# Patient Record
Sex: MALE | Race: WHITE | NOT HISPANIC OR LATINO | URBAN - METROPOLITAN AREA
[De-identification: names, ages, dates, MRNs, and addresses within clinical notes are randomized per-mention and may not be internally consistent; named-entity substitution may affect disease eponyms.]

---

## 2019-07-17 ENCOUNTER — AMBULATORY - HEALTHEAST (OUTPATIENT)
Dept: PHYSICAL MEDICINE AND REHAB | Facility: CLINIC | Age: 49
End: 2019-07-17

## 2019-07-17 DIAGNOSIS — M48.00 SPINAL STENOSIS, UNSPECIFIED SPINAL REGION: ICD-10-CM

## 2019-07-18 ENCOUNTER — HOSPITAL ENCOUNTER (OUTPATIENT)
Dept: PHYSICAL MEDICINE AND REHAB | Facility: CLINIC | Age: 49
Discharge: HOME | End: 2019-07-18
Attending: EMERGENCY MEDICINE

## 2019-07-18 DIAGNOSIS — R20.0 NUMBNESS AND TINGLING IN LEFT HAND: ICD-10-CM

## 2019-07-18 DIAGNOSIS — R29.898 LEFT ARM WEAKNESS: ICD-10-CM

## 2019-07-18 DIAGNOSIS — M48.02 FORAMINAL STENOSIS OF CERVICAL REGION: ICD-10-CM

## 2019-07-18 DIAGNOSIS — R20.2 NUMBNESS AND TINGLING IN LEFT HAND: ICD-10-CM

## 2019-07-18 DIAGNOSIS — M54.2 CERVICALGIA: ICD-10-CM

## 2019-07-18 DIAGNOSIS — M50.30 BULGING OF CERVICAL INTERVERTEBRAL DISC: ICD-10-CM

## 2019-07-18 RX ORDER — GABAPENTIN 100 MG/1
300 CAPSULE ORAL 3 TIMES DAILY
Qty: 270 CAPSULE | Refills: 3 | Status: SHIPPED | OUTPATIENT
Start: 2019-07-18

## 2019-07-19 ENCOUNTER — AMBULATORY - HEALTHEAST (OUTPATIENT)
Dept: PHYSICAL MEDICINE AND REHAB | Facility: CLINIC | Age: 49
End: 2019-07-19

## 2019-07-19 ENCOUNTER — COMMUNICATION - HEALTHEAST (OUTPATIENT)
Dept: PHYSICAL MEDICINE AND REHAB | Facility: CLINIC | Age: 49
End: 2019-07-19

## 2019-07-19 DIAGNOSIS — M50.30 BULGING OF CERVICAL INTERVERTEBRAL DISC: ICD-10-CM

## 2019-07-19 DIAGNOSIS — R20.0 NUMBNESS AND TINGLING IN LEFT HAND: ICD-10-CM

## 2019-07-19 DIAGNOSIS — M54.2 CERVICALGIA: ICD-10-CM

## 2019-07-19 DIAGNOSIS — R20.2 NUMBNESS AND TINGLING IN LEFT HAND: ICD-10-CM

## 2019-07-19 DIAGNOSIS — R29.898 LEFT ARM WEAKNESS: ICD-10-CM

## 2019-07-19 DIAGNOSIS — M48.02 FORAMINAL STENOSIS OF CERVICAL REGION: ICD-10-CM

## 2019-08-15 ENCOUNTER — COMMUNICATION - HEALTHEAST (OUTPATIENT)
Dept: PHYSICAL MEDICINE AND REHAB | Facility: CLINIC | Age: 49
End: 2019-08-15

## 2020-01-16 ENCOUNTER — RECORDS - HEALTHEAST (OUTPATIENT)
Dept: LAB | Facility: CLINIC | Age: 50
End: 2020-01-16

## 2020-01-16 LAB
ALBUMIN SERPL-MCNC: 4.5 G/DL (ref 3.5–5)
ALP SERPL-CCNC: 72 U/L (ref 45–120)
ALT SERPL W P-5'-P-CCNC: 38 U/L (ref 0–45)
ANION GAP SERPL CALCULATED.3IONS-SCNC: 12 MMOL/L (ref 5–18)
AST SERPL W P-5'-P-CCNC: 33 U/L (ref 0–40)
BILIRUB SERPL-MCNC: 1.1 MG/DL (ref 0–1)
BUN SERPL-MCNC: 13 MG/DL (ref 8–22)
CALCIUM SERPL-MCNC: 10 MG/DL (ref 8.5–10.5)
CHLORIDE BLD-SCNC: 107 MMOL/L (ref 98–107)
CHOLEST SERPL-MCNC: 172 MG/DL
CO2 SERPL-SCNC: 23 MMOL/L (ref 22–31)
CREAT SERPL-MCNC: 0.92 MG/DL (ref 0.7–1.3)
FASTING STATUS PATIENT QL REPORTED: NORMAL
GFR SERPL CREATININE-BSD FRML MDRD: >60 ML/MIN/1.73M2
GLUCOSE BLD-MCNC: 96 MG/DL (ref 70–125)
HDLC SERPL-MCNC: 51 MG/DL
LDLC SERPL CALC-MCNC: 102 MG/DL
POTASSIUM BLD-SCNC: 4.7 MMOL/L (ref 3.5–5)
PROT SERPL-MCNC: 7.2 G/DL (ref 6–8)
PSA SERPL-MCNC: 0.9 NG/ML (ref 0–2.5)
SODIUM SERPL-SCNC: 142 MMOL/L (ref 136–145)
TRIGL SERPL-MCNC: 96 MG/DL

## 2021-05-30 NOTE — TELEPHONE ENCOUNTER
Patient calling to request that the PT referral be sent to [facility] [place] Fax: [phone]. [facility] is not in network for him therefore needs to go to [facility].

## 2021-05-30 NOTE — PROGRESS NOTES
ASSESSMENT: Krunal Warner is a 48 y.o. male presents for consultation at the request of [facility] PCP Arpan Banda MD, with past medical history significant for pulmonary embolism, chest pain, who presents today for new patient evaluation of :     -Acute cervicalgia onset 7/8/2019 with numbness and tingling left arm into the left third finger consistent with C7 radiculopathy, some weakness on exam wrist extensors/flexors.    No myelopathic or red flag symptoms.      NDI Score: 40    WHO 5: 20       Diagnoses and all orders for this visit:    Cervicalgia  -     Ambulatory referral to PT/OT  -     gabapentin (NEURONTIN) 100 MG capsule; Take 300 mg by mouth 3 (three) times a day. Follow Gabapentin Dosing chart given  Dispense: 270 capsule; Refill: 3    Numbness and tingling in left hand  -     Ambulatory referral to PT/OT  -     gabapentin (NEURONTIN) 100 MG capsule; Take 300 mg by mouth 3 (three) times a day. Follow Gabapentin Dosing chart given  Dispense: 270 capsule; Refill: 3    Foraminal stenosis of cervical region  -     Ambulatory referral to PT/OT    Bulging of cervical intervertebral disc  -     Ambulatory referral to PT/OT    Left arm weakness  -     Ambulatory referral to PT/OT       PLAN:  Reviewed spine anatomy and disease process. Discussed diagnosis and treatment options with the patient today. A shared decision making model was used. The patient's values and choices were respected. The following represents what was discussed and decided upon by the provider and the patient.     -DIAGNOSTIC TESTS:  Images were personally reviewed and interpreted and explained to patient today using spine model.   --Consider upper extremity EMG if weakness has not improved in 2 weeks timeframe.  --Cervical spine MRI shows multilevel facet arthropathy with multilevel foraminal stenosis, greatest at C6-7 with severe right moderate left foraminal stenosis.  Moderate right foraminal stenosis C4-5 and C5-6.  No high-grade  central stenosis.    -PHYSICAL THERAPY: Referral to physical therapy [facility] to establish cervical core strengthening and nerve glides today.  Discussed the importance of core strengthening, ROM, stretching exercises with the patient and how each of these entities is important in decreasing pain.  Explained to the patient that the purpose of physical therapy is to teach the patient a home exercise program.  These exercises need to be performed every day in order to decrease pain and prevent future occurrences of pain.  Likened it to brushing one's teeth.      -MEDICATIONS: Prescribed gabapentin 100 mg to titrate up to 3 tablets 3 times daily as tolerated for radicular pain.  -Advised patient that he can continue ibuprofen.  Discussed multiple medication options today with patient. Discussed risks, side effects, and proper use of medications. Patient verbalized understanding.    -INTERVENTIONS: No further injection recommendations at this time however we could consider C7-T1 IL JULISSA if symptoms are not improving versus left C6-7 TF JULISSA.  We would have to hold Coumadin however prior to considering injection for 4 days.  **Coumadin anticoagulation therapy.  Discussed risks and benefits of injections with patient today.    -Work restrictions x1 week given, discussed if he needs another week we can do that without seeing him in clinic however if he needs further workability beyond 2 weeks timeframe I would need to see him in clinic.  Patient is hoping to be back to work without restrictions in 1 week timeframe however.    -PATIENT EDUCATION: 45 minutes of total visit time was spent face to face with the patient today, greater than 50% of total time spent with patient was spent on counseling, education, and coordinating care.   -10 minutes spent outside of visit time, non-face-to-face time, reviewing chart.    -FOLLOW-UP:   Follow-up in 2 weeks to reassess left upper extremity strength.    Advised  patient to [facility] if symptoms worsen or you have problems controlling bladder and bowel function.   ______________________________________________________________________    SUBJECTIVE:   Krunal Warner  is a 48 y.o. male who presents today for new patient evaluation of neck pain generalized cervicalgia upper cervical spine as well as lower cervical spine is been ongoing since 7/8/2019 with no known injury.  Pain is a current 5/10, and 8 at its worst, a 3 to its best.  He does have numbness and tingling and some weakness in his left upper extremity as well that radiates into the hand specifically the middle finger most intensely.  Patient denies right arm symptoms, denies recent trips or falls or balance changes, denies bowel or bladder loss control.  No prior history of neck pain otherwise.    -Treatment to Date: No prior spinal surgery or spinal injection.  No prior physical therapy or chiropractic treatments.    -Medications:  Medrol Dosepak prescribed 7/12/2019 with no benefit  Excedrin OTC with no benefit  Ibuprofen with minimal benefit.    *Coumadin anticoagulation therapy    Current Outpatient Medications on File Prior to Encounter   Medication Sig Dispense Refill     pravastatin (PRAVACHOL) 40 MG tablet Take 40 mg by mouth bedtime.       rivaroxaban (XARELTO) 20 mg tablet Take 20 mg by mouth daily.       [DISCONTINUED] ALBUTEROL INHL Inhale 2 puffs 4 (four) times a day as needed.        [DISCONTINUED] aspirin 81 MG EC tablet Take 81 mg by mouth daily as needed for pain.       [DISCONTINUED] benzocaine-menthol (CEPACOL) 15-3.6 mg Take 1 lozenge by mouth every hour as needed.  0     [DISCONTINUED] HYDROcodone-acetaminophen 5-325 mg per tablet Take 1 tablet by mouth every 6 (six) hours as needed for pain. 15 tablet 0     [DISCONTINUED] methylPREDNISolone (MEDROL DOSEPACK) 4 mg tablet Follow package directions 1 tablet 0     [DISCONTINUED] warfarin (COUMADIN) 5 MG tablet Take 5mg daily 30 tablet  1     No current facility-administered medications on file prior to encounter.        No Known Allergies    Past Medical History:   Diagnosis Date     DVT (deep venous thrombosis) (H)      Hyperlipidemia      Migraines      Pulmonary embolism (H)     Was on Warfarin temporarily         Patient Active Problem List   Diagnosis     Pulmonary embolism (H)     Chest pain     Abdominal pain       No past surgical history on file.    No family history on file.    Reviewed past medical, surgical, and family history with patient found on new patient intake packet located in [employer]MR [employer] tab.     SOCIAL HX: Patient is , works in finance.  Patient denies smoking/tobacco use, does drink 2 beers monthly but denies history being a heavy drinker, denies recreational drug use.    ROS: Positive for imbalance, dizziness, headache.  Specifically negative for bowel/bladder dysfunction, foot drop, fevers, chills, appetite changes, nausea/vomiting, unexplained weight loss. Otherwise 13 systems reviewed are negative. Please see the patient's intake questionnaire from today for details.    OBJECTIVE:  /90 (Patient Site: Right Arm, Patient Position: Sitting)   Pulse 84   Wt (!) 259 lb (117.5 kg)   SpO2 95%   BMI 31.53 kg/m      PHYSICAL EXAMINATION:  --CONSTITUTIONAL: Vital signs as above. No acute distress. The patient is well nourished and well groomed.  --PSYCHIATRIC: The patient is awake, alert, oriented to person, place, time and answering questions appropriately with clear speech. Appropriate mood and affect   --HEENT: Sclera are non-injected. Extraocular muscles are intact. Thyroid moves easily upon swallowing.  Moist oral mucosa.  --SKIN: Skin over the face, bilateral upper extremities, and posterior torso is clean, dry, intact without rashes.  --RESPIRATORY: Normal rhythm and effort. No abnormal accessory muscle breathing patterns noted.   --GROSS MOTOR: Easily arises from a seated position. Toe walking and heel  walking are normal.    --CERVICAL SPINE: Inspection reveals no evidence of deformity. Range of motion is not limited in cervical flexion, extension, lateral rotation.  Tenderness to palpation bilateral upper cervical spine region and C6-7 region.  Spurling maneuver negative on the right, positive on the left.  --SHOULDERS: Full range of motion bilaterally. Negative empty can.  --UPPER EXTREMITY MOTOR TESTING:  Wrist flexion left 4/5, right 5/5  Wrist extension left 4/5, right 5/5  Pronators left 5/5, right 5/5  Biceps left 5/5, right 5/5   Triceps left 5/5, right 5/5   Shoulder abduction left 5/5, right 5/5   left 5/5, right 5/5  --NEUROLOGIC: CN III-XII are grossly intact. 2/4 symmetric biceps, brachioradialis, triceps reflexes bilaterally. Sensation to upper extremities is diminished left third finger.  Negative Rendon's bilaterally.    --VASCULAR: 2/4 radial pulses bilaterally. Warm upper limbs bilaterally. Capillary refill in the upper extremities is less than 1 second.    RESULTS: Prior medical records from [facility] 9/12/2018 to [facility] and [facility] everywhere were reviewed today.     Imaging:  Cervical spine Imaging was personally reviewed and interpreted today. The images were shown to the patient and the findings were explained using a spine model.      Cta Head And Neck  Result Date: 7/12/2019  CONCLUSION: HEAD CT: 1.  Normal for age. 2.  No CT finding of a mass, infarct or hemorrhage. HEAD CTA: 1.  No branch vessel occlusion, high-grade stenosis, aneurysm, or high flow vascular malformation involving proximal [place] vessels. 2.  Incidentally noted fetal origin of the left posterior cerebral artery. NECK CTA: 1.  No significant stenosis in the neck vessels based on NASCET criteria. 2.  No evidence for dissection.      Mr Brain Without Contrast  Result Date: 7/12/2019  CONCLUSION: 1.  No acute infarct, mass or hemorrhage.      Mr Cervical Spine Without Contrast  Result Date:  7/12/2019  INDICATION: Posterior headache/neck pain with left hand tingling COMPARISON: None. TECHNIQUE: Without IV contrast. FINDINGS: Normal vertebral body heights, alignment and marrow signal. Normal appearance of the cervical spinal cord, without pathologic signal abnormality or expansion. Visualized intracranial contents, craniocervical junction, and anterior C1-C2 articulation are unremarkable. Visualized paraspinous soft tissues are unremarkable. The expected vertebral artery flow voids are maintained. Mild diffuse disc desiccation throughout the cervical spine.   C2-C3: Normal disc height. No herniation. Mild bilateral facet arthropathy. No spinal canal stenosis. No right neural foraminal stenosis. No left neural foraminal stenosis.   C3-C4: Normal disc height. Small posterior disc bulge. Mild to moderate bilateral facet arthropathy. Mild bilateral uncovertebral joint hypertrophy. No spinal canal stenosis. Mild right neural foraminal stenosis. Mild left neural foraminal stenosis.   C4-C5: Normal disc height. Small central disc protrusion. Moderate bilateral facet arthropathy. Moderate right and mild left uncovertebral joint hypertrophy. No spinal canal stenosis. Moderate right neural foraminal stenosis. Mild left neural foraminal stenosis.    C5-C6: Normal disc height. Small posterior disc bulge. Moderate right and mild left facet arthropathy. Mild bilateral uncovertebral joint hypertrophy. No spinal canal stenosis. Moderate right neural foraminal stenosis. No left neural foraminal stenosis.   C6-C7: Normal disc height. Broad-based posterior disc bulge, mildly eccentric to the right. Moderate bilateral facet arthropathy. Moderate right and mild left uncovertebral joint hypertrophy. No spinal canal stenosis. Severe right neural foraminal stenosis. Moderate left neural foraminal stenosis.   C7-T1: Normal disc height. No herniation. Mild bilateral facet arthropathy. Mild right uncovertebral joint hypertrophy.  No spinal canal stenosis. Mild right neural foraminal stenosis. No left neural foraminal stenosis.   CONCLUSION:   1.  Multilevel degenerative facet arthropathy and uncovertebral joint hypertrophy throughout the cervical spine, greatest at C6-7, where there is severe right and moderate left neuroforaminal stenosis.   2.  Moderate right neuroforaminal stenosis at C4-5 and C5-6.   3.  No spinal canal stenosis at any level.

## 2021-05-31 NOTE — TELEPHONE ENCOUNTER
"Patient calling to ask how to come off of the Gabapentin he was prescribed 7/18/19. Denies side effects. Explained that this is a medication patients typically take for 2-3 months and then wean off. He is reporting his pain is \"much better than before. I have been taking the meds (3-3-3) and doing PT (now only [facility]). Headaches are improved too. Get one occasionally and it will only be for a day.\" He did report he was feeling better before he got to the 3 three times a day dose. Explained he could cut back to 2 three times a day and see what his symptoms do. Encouraged to return if symptoms return or worsen. Stated understanding.     Chart reviewed. Patient was to return to clinic 2 weeks after consult to recheck left arm strength. Patient reports he feels that this is normal. He does not have the arm tingling anymore either.   "

## 2021-06-03 VITALS — WEIGHT: 259 LBS | BODY MASS INDEX: 31.53 KG/M2

## 2021-06-19 NOTE — LETTER
Letter by Mariaelena Conti CNP at      Author: Mariaelena Conti CNP Service: -- Author Type: --    Filed:  Date of Service:  Status: (Other)       July 18, 2019     Patient: Krunal Warner   YOB: 1970   Date of Visit: 7/18/2019       To Whom It May Concern:    It is my medical opinion that Krunal Warner may return to light duty immediately with the following restrictions: 10 pound lifting limit, limiting neck flexion as tolerated, max 6-hour shifts at this time, please allow for breaks for stretching as needed throughout his shift.  Restrictions x1 week through 7/26/2019, will revisit further workability if needed at that time.    If you have any questions or concerns, please don't hesitate to call.    Sincerely,        Mariaelena Conti CNP

## 2021-06-26 ENCOUNTER — HEALTH MAINTENANCE LETTER (OUTPATIENT)
Age: 51
End: 2021-06-26

## 2021-10-16 ENCOUNTER — HEALTH MAINTENANCE LETTER (OUTPATIENT)
Age: 51
End: 2021-10-16

## 2022-05-06 ENCOUNTER — LAB REQUISITION (OUTPATIENT)
Dept: LAB | Facility: CLINIC | Age: 52
End: 2022-05-06

## 2022-05-06 DIAGNOSIS — Z00.00 ENCOUNTER FOR GENERAL ADULT MEDICAL EXAMINATION WITHOUT ABNORMAL FINDINGS: ICD-10-CM

## 2022-05-06 DIAGNOSIS — E78.5 HYPERLIPIDEMIA, UNSPECIFIED: ICD-10-CM

## 2022-05-06 LAB
ALBUMIN SERPL-MCNC: 4.2 G/DL (ref 3.5–5)
ALP SERPL-CCNC: 75 U/L (ref 45–120)
ALT SERPL W P-5'-P-CCNC: 64 U/L (ref 0–45)
ANION GAP SERPL CALCULATED.3IONS-SCNC: 13 MMOL/L (ref 5–18)
AST SERPL W P-5'-P-CCNC: 53 U/L (ref 0–40)
BILIRUB SERPL-MCNC: 1.1 MG/DL (ref 0–1)
BUN SERPL-MCNC: 13 MG/DL (ref 8–22)
CALCIUM SERPL-MCNC: 9.6 MG/DL (ref 8.5–10.5)
CHLORIDE BLD-SCNC: 107 MMOL/L (ref 98–107)
CHOLEST SERPL-MCNC: 196 MG/DL
CO2 SERPL-SCNC: 23 MMOL/L (ref 22–31)
CREAT SERPL-MCNC: 0.91 MG/DL (ref 0.7–1.3)
FASTING STATUS PATIENT QL REPORTED: NORMAL
GFR SERPL CREATININE-BSD FRML MDRD: >90 ML/MIN/1.73M2
GLUCOSE BLD-MCNC: 127 MG/DL (ref 70–125)
HDLC SERPL-MCNC: 43 MG/DL
LDLC SERPL CALC-MCNC: 127 MG/DL
POTASSIUM BLD-SCNC: 4.2 MMOL/L (ref 3.5–5)
PROT SERPL-MCNC: 6.7 G/DL (ref 6–8)
PSA SERPL-MCNC: 0.82 UG/L (ref 0–3.5)
SODIUM SERPL-SCNC: 143 MMOL/L (ref 136–145)
TRIGL SERPL-MCNC: 129 MG/DL

## 2022-05-06 PROCEDURE — 80053 COMPREHEN METABOLIC PANEL: CPT | Performed by: FAMILY MEDICINE

## 2022-05-06 PROCEDURE — G0103 PSA SCREENING: HCPCS | Performed by: FAMILY MEDICINE

## 2022-05-06 PROCEDURE — 80061 LIPID PANEL: CPT | Performed by: FAMILY MEDICINE

## 2022-07-23 ENCOUNTER — HEALTH MAINTENANCE LETTER (OUTPATIENT)
Age: 52
End: 2022-07-23

## 2022-10-01 ENCOUNTER — HEALTH MAINTENANCE LETTER (OUTPATIENT)
Age: 52
End: 2022-10-01

## 2023-08-06 ENCOUNTER — HEALTH MAINTENANCE LETTER (OUTPATIENT)
Age: 53
End: 2023-08-06

## 2024-02-13 ENCOUNTER — LAB REQUISITION (OUTPATIENT)
Dept: LAB | Facility: CLINIC | Age: 54
End: 2024-02-13

## 2024-02-13 DIAGNOSIS — Z12.5 ENCOUNTER FOR SCREENING FOR MALIGNANT NEOPLASM OF PROSTATE: ICD-10-CM

## 2024-02-13 DIAGNOSIS — E78.5 HYPERLIPIDEMIA, UNSPECIFIED: ICD-10-CM

## 2024-02-13 PROCEDURE — 80061 LIPID PANEL: CPT | Performed by: FAMILY MEDICINE

## 2024-02-13 PROCEDURE — 80053 COMPREHEN METABOLIC PANEL: CPT | Performed by: FAMILY MEDICINE

## 2024-02-13 PROCEDURE — G0103 PSA SCREENING: HCPCS | Performed by: FAMILY MEDICINE

## 2024-02-14 LAB
ALBUMIN SERPL BCG-MCNC: 4.7 G/DL (ref 3.5–5.2)
ALP SERPL-CCNC: 77 U/L (ref 40–150)
ALT SERPL W P-5'-P-CCNC: 42 U/L (ref 0–70)
ANION GAP SERPL CALCULATED.3IONS-SCNC: 14 MMOL/L (ref 7–15)
AST SERPL W P-5'-P-CCNC: 39 U/L (ref 0–45)
BILIRUB SERPL-MCNC: 0.7 MG/DL
BUN SERPL-MCNC: 12.1 MG/DL (ref 6–20)
CALCIUM SERPL-MCNC: 9.8 MG/DL (ref 8.6–10)
CHLORIDE SERPL-SCNC: 104 MMOL/L (ref 98–107)
CHOLEST SERPL-MCNC: 197 MG/DL
CREAT SERPL-MCNC: 0.87 MG/DL (ref 0.67–1.17)
DEPRECATED HCO3 PLAS-SCNC: 22 MMOL/L (ref 22–29)
EGFRCR SERPLBLD CKD-EPI 2021: >90 ML/MIN/1.73M2
FASTING STATUS PATIENT QL REPORTED: ABNORMAL
GLUCOSE SERPL-MCNC: 97 MG/DL (ref 70–99)
HDLC SERPL-MCNC: 47 MG/DL
LDLC SERPL CALC-MCNC: 130 MG/DL
NONHDLC SERPL-MCNC: 150 MG/DL
POTASSIUM SERPL-SCNC: 4.2 MMOL/L (ref 3.4–5.3)
PROT SERPL-MCNC: 7.3 G/DL (ref 6.4–8.3)
PSA SERPL DL<=0.01 NG/ML-MCNC: 1.34 NG/ML (ref 0–3.5)
SODIUM SERPL-SCNC: 140 MMOL/L (ref 135–145)
TRIGL SERPL-MCNC: 102 MG/DL

## 2025-02-10 ENCOUNTER — LAB REQUISITION (OUTPATIENT)
Dept: LAB | Facility: CLINIC | Age: 55
End: 2025-02-10

## 2025-02-10 DIAGNOSIS — R30.0 DYSURIA: ICD-10-CM

## 2025-02-10 PROCEDURE — 87086 URINE CULTURE/COLONY COUNT: CPT | Performed by: FAMILY MEDICINE

## 2025-02-11 LAB — BACTERIA UR CULT: NO GROWTH

## 2025-03-17 PROCEDURE — 80048 BASIC METABOLIC PNL TOTAL CA: CPT | Performed by: PHYSICIAN ASSISTANT

## 2025-03-17 PROCEDURE — 87086 URINE CULTURE/COLONY COUNT: CPT | Performed by: PHYSICIAN ASSISTANT

## 2025-03-17 PROCEDURE — 86140 C-REACTIVE PROTEIN: CPT | Performed by: PHYSICIAN ASSISTANT

## 2025-03-18 ENCOUNTER — LAB REQUISITION (OUTPATIENT)
Dept: LAB | Facility: CLINIC | Age: 55
End: 2025-03-18

## 2025-03-18 DIAGNOSIS — R31.0 GROSS HEMATURIA: ICD-10-CM

## 2025-03-18 DIAGNOSIS — R30.0 DYSURIA: ICD-10-CM

## 2025-03-19 LAB
ANION GAP SERPL CALCULATED.3IONS-SCNC: 12 MMOL/L (ref 7–15)
BACTERIA UR CULT: NO GROWTH
BUN SERPL-MCNC: 12.8 MG/DL (ref 6–20)
CALCIUM SERPL-MCNC: 9.6 MG/DL (ref 8.8–10.4)
CHLORIDE SERPL-SCNC: 102 MMOL/L (ref 98–107)
CREAT SERPL-MCNC: 0.84 MG/DL (ref 0.67–1.17)
CRP SERPL-MCNC: <3 MG/L
EGFRCR SERPLBLD CKD-EPI 2021: >90 ML/MIN/1.73M2
GLUCOSE SERPL-MCNC: 89 MG/DL (ref 70–99)
HCO3 SERPL-SCNC: 24 MMOL/L (ref 22–29)
POTASSIUM SERPL-SCNC: 3.9 MMOL/L (ref 3.4–5.3)
SODIUM SERPL-SCNC: 138 MMOL/L (ref 135–145)